# Patient Record
Sex: FEMALE | Race: WHITE | Employment: FULL TIME | URBAN - METROPOLITAN AREA
[De-identification: names, ages, dates, MRNs, and addresses within clinical notes are randomized per-mention and may not be internally consistent; named-entity substitution may affect disease eponyms.]

---

## 2017-04-19 PROBLEM — Z98.84 S/P BARIATRIC SURGERY: Status: ACTIVE | Noted: 2017-04-19

## 2021-10-20 PROBLEM — E66.01 OBESITY, MORBID (HCC): Status: ACTIVE | Noted: 2021-10-20

## 2021-10-20 PROBLEM — Z98.84 S/P GASTRIC BYPASS: Status: ACTIVE | Noted: 2017-04-19

## 2024-04-04 RX ORDER — GARLIC EXTRACT 500 MG
1 CAPSULE ORAL DAILY
COMMUNITY

## 2024-04-04 RX ORDER — ACETAMINOPHEN 500 MG
500 TABLET ORAL EVERY 6 HOURS PRN
COMMUNITY

## 2024-04-04 RX ORDER — CHOLECALCIFEROL (VITAMIN D3) 50 MCG
1 TABLET ORAL
COMMUNITY

## 2024-04-04 RX ORDER — DIETHYLPROPION HYDROCHLORIDE 25 MG/1
1 TABLET ORAL
COMMUNITY

## 2024-04-04 RX ORDER — DOCUSATE SODIUM 100 MG/1
100 CAPSULE, LIQUID FILLED ORAL 2 TIMES DAILY
COMMUNITY

## 2024-04-04 RX ORDER — LORAZEPAM 0.5 MG/1
0.5 TABLET ORAL DAILY
COMMUNITY

## 2024-04-04 RX ORDER — NALTREXONE HYDROCHLORIDE AND BUPROPION HYDROCHLORIDE 8; 90 MG/1; MG/1
1 TABLET, EXTENDED RELEASE ORAL 2 TIMES DAILY
COMMUNITY

## 2024-04-04 NOTE — PAT NURSING NOTE
LM with Ina,  from Dr. Lee's office about need to reschedule since pt took her Phentermine dose on 4/3 . Verified with Angelo and anesthesia that pt has to be off meds for 1 week . Awaiting for callback .

## 2024-04-04 NOTE — PAT NURSING NOTE
Spoke with  Ina; pt will be rescheduled and was already notified by her . Will fax a new ticket .

## 2024-04-10 ENCOUNTER — ANESTHESIA (OUTPATIENT)
Dept: SURGERY | Facility: HOSPITAL | Age: 39
End: 2024-04-10
Payer: COMMERCIAL

## 2024-04-10 ENCOUNTER — ANESTHESIA EVENT (OUTPATIENT)
Dept: SURGERY | Facility: HOSPITAL | Age: 39
End: 2024-04-10
Payer: COMMERCIAL

## 2024-04-10 ENCOUNTER — HOSPITAL ENCOUNTER (OUTPATIENT)
Facility: HOSPITAL | Age: 39
Setting detail: HOSPITAL OUTPATIENT SURGERY
Discharge: HOME | End: 2024-04-10
Attending: OBSTETRICS & GYNECOLOGY | Admitting: OBSTETRICS & GYNECOLOGY
Payer: COMMERCIAL

## 2024-04-10 VITALS
DIASTOLIC BLOOD PRESSURE: 60 MMHG | OXYGEN SATURATION: 99 % | BODY MASS INDEX: 53.92 KG/M2 | RESPIRATION RATE: 14 BRPM | TEMPERATURE: 99 F | WEIGHT: 293 LBS | HEART RATE: 88 BPM | HEIGHT: 62 IN | SYSTOLIC BLOOD PRESSURE: 134 MMHG

## 2024-04-10 LAB
B-HCG UR QL: NEGATIVE
HPV I/H RISK 1 DNA SPEC QL NAA+PROBE: NEGATIVE

## 2024-04-10 PROCEDURE — 0UPD7HZ REMOVAL OF CONTRACEPTIVE DEVICE FROM UTERUS AND CERVIX, VIA NATURAL OR ARTIFICIAL OPENING: ICD-10-PCS | Performed by: OBSTETRICS & GYNECOLOGY

## 2024-04-10 PROCEDURE — 87624 HPV HI-RISK TYP POOLED RSLT: CPT | Performed by: OBSTETRICS & GYNECOLOGY

## 2024-04-10 PROCEDURE — 88300 SURGICAL PATH GROSS: CPT | Performed by: OBSTETRICS & GYNECOLOGY

## 2024-04-10 PROCEDURE — 81025 URINE PREGNANCY TEST: CPT

## 2024-04-10 PROCEDURE — 0UH97HZ INSERTION OF CONTRACEPTIVE DEVICE INTO UTERUS, VIA NATURAL OR ARTIFICIAL OPENING: ICD-10-PCS | Performed by: OBSTETRICS & GYNECOLOGY

## 2024-04-10 DEVICE — MIRENA IUD: Type: IMPLANTABLE DEVICE | Status: FUNCTIONAL

## 2024-04-10 RX ORDER — IBUPROFEN 600 MG/1
600 TABLET ORAL EVERY 8 HOURS PRN
Qty: 20 TABLET | Refills: 0 | Status: SHIPPED | OUTPATIENT
Start: 2024-04-10

## 2024-04-10 RX ORDER — DEXAMETHASONE SODIUM PHOSPHATE 4 MG/ML
VIAL (ML) INJECTION AS NEEDED
Status: DISCONTINUED | OUTPATIENT
Start: 2024-04-10 | End: 2024-04-10 | Stop reason: SURG

## 2024-04-10 RX ORDER — FAMOTIDINE 20 MG/1
20 TABLET, FILM COATED ORAL ONCE
Status: COMPLETED | OUTPATIENT
Start: 2024-04-10 | End: 2024-04-10

## 2024-04-10 RX ORDER — NALOXONE HYDROCHLORIDE 0.4 MG/ML
0.08 INJECTION, SOLUTION INTRAMUSCULAR; INTRAVENOUS; SUBCUTANEOUS AS NEEDED
Status: DISCONTINUED | OUTPATIENT
Start: 2024-04-10 | End: 2024-04-10

## 2024-04-10 RX ORDER — SODIUM CHLORIDE, SODIUM LACTATE, POTASSIUM CHLORIDE, CALCIUM CHLORIDE 600; 310; 30; 20 MG/100ML; MG/100ML; MG/100ML; MG/100ML
INJECTION, SOLUTION INTRAVENOUS CONTINUOUS
Status: DISCONTINUED | OUTPATIENT
Start: 2024-04-10 | End: 2024-04-10

## 2024-04-10 RX ORDER — MORPHINE SULFATE 4 MG/ML
2 INJECTION, SOLUTION INTRAMUSCULAR; INTRAVENOUS EVERY 10 MIN PRN
Status: DISCONTINUED | OUTPATIENT
Start: 2024-04-10 | End: 2024-04-10

## 2024-04-10 RX ORDER — HYDROMORPHONE HYDROCHLORIDE 1 MG/ML
0.2 INJECTION, SOLUTION INTRAMUSCULAR; INTRAVENOUS; SUBCUTANEOUS EVERY 5 MIN PRN
Status: DISCONTINUED | OUTPATIENT
Start: 2024-04-10 | End: 2024-04-10

## 2024-04-10 RX ORDER — FAMOTIDINE 10 MG/ML
20 INJECTION, SOLUTION INTRAVENOUS ONCE
Status: COMPLETED | OUTPATIENT
Start: 2024-04-10 | End: 2024-04-10

## 2024-04-10 RX ORDER — HYDROMORPHONE HYDROCHLORIDE 1 MG/ML
0.6 INJECTION, SOLUTION INTRAMUSCULAR; INTRAVENOUS; SUBCUTANEOUS EVERY 5 MIN PRN
Status: DISCONTINUED | OUTPATIENT
Start: 2024-04-10 | End: 2024-04-10

## 2024-04-10 RX ORDER — LIDOCAINE HYDROCHLORIDE 10 MG/ML
INJECTION, SOLUTION EPIDURAL; INFILTRATION; INTRACAUDAL; PERINEURAL AS NEEDED
Status: DISCONTINUED | OUTPATIENT
Start: 2024-04-10 | End: 2024-04-10 | Stop reason: SURG

## 2024-04-10 RX ORDER — LIDOCAINE HYDROCHLORIDE 40 MG/ML
SOLUTION TOPICAL AS NEEDED
Status: DISCONTINUED | OUTPATIENT
Start: 2024-04-10 | End: 2024-04-10 | Stop reason: SURG

## 2024-04-10 RX ORDER — ACETAMINOPHEN 500 MG
1000 TABLET ORAL ONCE
Status: DISCONTINUED | OUTPATIENT
Start: 2024-04-10 | End: 2024-04-10 | Stop reason: HOSPADM

## 2024-04-10 RX ORDER — MORPHINE SULFATE 4 MG/ML
4 INJECTION, SOLUTION INTRAMUSCULAR; INTRAVENOUS EVERY 10 MIN PRN
Status: DISCONTINUED | OUTPATIENT
Start: 2024-04-10 | End: 2024-04-10

## 2024-04-10 RX ORDER — HYDROMORPHONE HYDROCHLORIDE 1 MG/ML
0.4 INJECTION, SOLUTION INTRAMUSCULAR; INTRAVENOUS; SUBCUTANEOUS EVERY 5 MIN PRN
Status: DISCONTINUED | OUTPATIENT
Start: 2024-04-10 | End: 2024-04-10

## 2024-04-10 RX ORDER — MORPHINE SULFATE 10 MG/ML
6 INJECTION, SOLUTION INTRAMUSCULAR; INTRAVENOUS EVERY 10 MIN PRN
Status: DISCONTINUED | OUTPATIENT
Start: 2024-04-10 | End: 2024-04-10

## 2024-04-10 RX ORDER — ONDANSETRON 2 MG/ML
INJECTION INTRAMUSCULAR; INTRAVENOUS AS NEEDED
Status: DISCONTINUED | OUTPATIENT
Start: 2024-04-10 | End: 2024-04-10 | Stop reason: SURG

## 2024-04-10 RX ADMIN — DEXAMETHASONE SODIUM PHOSPHATE 4 MG: 4 MG/ML VIAL (ML) INJECTION at 12:29:00

## 2024-04-10 RX ADMIN — LIDOCAINE HYDROCHLORIDE 50 MG: 10 INJECTION, SOLUTION EPIDURAL; INFILTRATION; INTRACAUDAL; PERINEURAL at 12:15:00

## 2024-04-10 RX ADMIN — LIDOCAINE HYDROCHLORIDE 4 ML: 40 SOLUTION TOPICAL at 12:22:00

## 2024-04-10 RX ADMIN — ONDANSETRON 4 MG: 2 INJECTION INTRAMUSCULAR; INTRAVENOUS at 12:29:00

## 2024-04-10 RX ADMIN — SODIUM CHLORIDE, SODIUM LACTATE, POTASSIUM CHLORIDE, CALCIUM CHLORIDE: 600; 310; 30; 20 INJECTION, SOLUTION INTRAVENOUS at 12:15:00

## 2024-04-10 NOTE — ANESTHESIA POSTPROCEDURE EVALUATION
Patient: Ila Jose    Procedure Summary       Date: 04/10/24 Room / Location: [facility] MAIN OR  / [facility] MAIN OR    Anesthesia Start: 1215 Anesthesia Stop: 1251    Procedure: Mirena intrauterine device removal and replacement, under anesthesia, pap smear (Vagina ) Diagnosis: (Endometrial hyperplasia, morbid obesity)    Surgeons: Magali Lee MD Anesthesiologist: Pranay Linn MD    Anesthesia Type: general ASA Status: 4            Anesthesia Type: general    Vitals Value Taken Time   /70 04/10/24 1251   Temp 97.8 °F (36.6 °C) 04/10/24 1250   Pulse 88 04/10/24 1251   Resp 14 04/10/24 1251   SpO2 100 % 04/10/24 1251   Vitals shown include unfiled device data.    [facility] AN Post Evaluation:   Patient Evaluated in PACU  Patient Participation: complete - patient participated  Level of Consciousness: awake and alert  Pain Management: adequate  Airway Patency:patent  Yes    Nausea/Vomiting: none  Cardiovascular Status: acceptable  Respiratory Status: acceptable and nasal cannula  Postoperative Hydration acceptable      Pranay Linn MD  4/10/2024 12:51 PM

## 2024-04-10 NOTE — ANESTHESIA PREPROCEDURE EVALUATION
Anesthesia PreOp Note    HPI:     Ila Jose is a 38 year old female who presents for preoperative consultation requested by: Magali Lee MD    Date of Surgery: 4/10/2024    Procedure(s):  Mirena intrauterine device removal and replacement, under anesthesia  Indication: Endometrial hyperplasia, morbid obesity    Relevant Problems   No relevant active problems       NPO:  Last Liquid Consumption Date: 04/09/24  Last Liquid Consumption Time: 2315  Last Solid Consumption Date: 04/09/24  Last Solid Consumption Time: 2315  Last Liquid Consumption Date: 04/09/24          History Review:  Patient Active Problem List    Diagnosis Date Noted    Obesity, morbid (HCC) 10/20/2021    S/P gastric bypass 04/19/2017    History of pulmonary embolism     LATRELL (obstructive sleep apnea) 03/04/2015    Complex endometrial hyperplasia without atypia 01/13/2011    Allergic rhinitis 04/28/2010    Anxiety 04/28/2010    Rosacea 04/28/2010    PCOS (polycystic ovarian syndrome) 03/16/2010    Personal history of pulmonary embolism 03/16/2010    Obesity, unspecified 04/14/2008       Past Medical History:    Allergic rhinitis, cause unspecified    Anxiety state    Depression    Depressive disorder, not elsewhere classified    History of pulmonary embolism    Poss asso with BCP    HOSPITALIZATIONS    pulmonary embolus    Obesity    LATRELL (obstructive sleep apnea)    severe, AHI 36, REM AHI 77, O2 wally 63%.  AutoPAP    Panic disorder    PCOS (polycystic ovarian syndrome)    Pulmonary embolism ([facility])    2011 or 2013    Rosacea    Shortness of breath    as child    Sleep apnea    cpap    Visual impairment    glasses       Past Surgical History:   Procedure Laterality Date    D & c  05/2010    hysteroscopy D&C complex hyperplasia without atypia    Gastric bypass,obese<100cm maurilio-en-y  2016    Gastric bypass,obesity,sb reconstruc  2016    Other surgical history      wisdom teeth extraction    Tonsillectomy         Medications Prior to Admission    Medication Sig Dispense Refill Last Dose    acidophilus-pectin Oral Cap Take 1 capsule by mouth daily.       LORazepam 0.5 MG Oral Tab Take 1 tablet (0.5 mg total) by mouth daily.   4/10/2024 at 0930    docusate sodium 100 MG Oral Cap Take 1 capsule (100 mg total) by mouth 2 (two) times daily.   4/9/2024 at 1200    Cholecalciferol (VITAMIN D) 50 MCG (2000 UT) Oral Tab Take 1 tablet by mouth daily with breakfast.   4/9/2024 at 1200    Diethylpropion HCl 25 MG Oral Tab Take 1 tablet by mouth daily with breakfast.   4/3/2024    Naltrexone-buPROPion HCl ER (CONTRAVE) 8-90 MG Oral Tablet 12 Hr Take 1 tablet by mouth in the morning and 1 tablet before bedtime.   4/3/2024    acetaminophen 500 MG Oral Tab Take 1 tablet (500 mg total) by mouth every 6 (six) hours as needed for Pain.   4/10/2024 at 0930    metFORMIN HCl  MG Oral Tablet 24 Hr Take 1 tablet (750 mg total) by mouth daily with breakfast. (Patient taking differently: Take 1 tablet (750 mg total) by mouth daily with breakfast. Takes for PCOS) 90 tablet 2 4/9/2024 at 1200    Phentermine HCl 15 MG Oral Cap Take 1 capsule (15 mg total) by mouth every morning. 30 capsule 0 4/3/2024    topiramate 25 MG Oral Tab Take 1 tablet (25 mg total) by mouth daily with lunch. (Patient taking differently: Take 1 tablet (25 mg total) by mouth daily.) 30 tablet 0 4/9/2024 at 1200    Nystatin Does not apply Powder Apply to skin folds BID prn 3 each 5     Multiple Vitamin (MULTI-DAY) Oral Tab Take 2 tablets by mouth daily with breakfast.   4/3/2024    valACYclovir HCl 1 G Oral Tab TAKE ONE TABLET BY MOUTH ONE TIME DAILY 30 tablet 11 4/9/2024 at 1200    CLARITIN 10 MG OR TABS 1 TABLET DAILY   4/9/2024 at 1200     Current Facility-Administered Medications Ordered in Epic   Medication Dose Route Frequency Provider Last Rate Last Admin    lactated ringers infusion   Intravenous Continuous Magali Lee MD 20 mL/hr at 04/10/24 1030 New Bag at 04/10/24 1030    acetaminophen  (Tylenol Extra Strength) tab 1,000 mg  1,000 mg Oral Once Magali Lee MD         No current Epic-ordered outpatient medications on file.       Allergies   Allergen Reactions    Sulfa Antibiotics RASH       Family History   Problem Relation Age of Onset    Other (COPD) Father         smoker    Other (LATRELL) Father         on CPAP    Other (macular degeneration) Father     Other (thyroid disorder) Mother     Other (Other) Maternal Grandmother         blood disorder    Diabetes Maternal Grandfather     Other (Parkinons) Maternal Grandfather     Other (alzheimer's) Maternal Grandfather     Other (COPD) Paternal Grandmother         smoker    Other (COPD) Paternal Grandfather         smoker     Social History     Socioeconomic History    Marital status: Single   Tobacco Use    Smoking status: Never     Passive exposure: Yes    Smokeless tobacco: Never   Vaping Use    Vaping status: Never Used   Substance and Sexual Activity    Alcohol use: Yes     Alcohol/week: 1.0 standard drink of alcohol     Types: 1 Standard drinks or equivalent per week     Comment: 1-2 times a month    Drug use: No       Available pre-op labs reviewed.  Lab Results   Component Value Date    URINEPREG Negative 04/10/2024             Vital Signs:  Body mass index is 62.19 kg/m².   height is 1.575 m (5' 2\") and weight is 154.2 kg (340 lb) (abnormal). Her oral temperature is 98.4 °F (36.9 °C). Her blood pressure is 150/74 and her pulse is 93. Her respiration is 18 and oxygen saturation is 95%.   Vitals:    04/04/24 1432 04/10/24 1026   BP:  150/74   Pulse:  93   Resp:  18   Temp:  98.4 °F (36.9 °C)   TempSrc:  Oral   SpO2:  95%   Weight: (!) 154.2 kg (340 lb) (!) 154.2 kg (340 lb)   Height: 1.575 m (5' 2\") 1.575 m (5' 2\")        Anesthesia Evaluation     Patient summary reviewed and Nursing notes reviewed    No history of anesthetic complications   Airway   Mallampati: II  TM distance: >3 FB  Neck ROM: full  Dental - Dentition appears grossly intact      Pulmonary - normal exam   (+) sleep apnea  Cardiovascular - normal exam    ROS comment: TTE 2024 EF 55-60%    Neuro/Psych    (+)  anxiety/panic attacks,  depression      GI/Hepatic/Renal - negative ROS     Endo/Other - negative ROS   Abdominal                  Anesthesia Plan:   ASA:  4  Plan:   General  Airway:  ETT and Video laryngoscope  Post-op Pain Management: IV analgesics and Local  Plan Comments: I have discussed the anesthetic plan, major risks and alternatives with the patient and answered all questions. The patient desires to proceed with surgery and anesthesia as planned.     Informed Consent Plan and Risks Discussed With:  Patient and mother      I have informed Ila Jose and/or legal guardian or family member of the nature of the anesthetic plan, benefits, risks including possible dental damage if relevant, major complications, and any alternative forms of anesthetic management.   All of the patient's questions were answered to the best of my ability. The patient desires the anesthetic management as planned.  Pranay Linn MD  4/10/2024 11:25 AM  Present on Admission:  **None**

## 2024-04-10 NOTE — DISCHARGE INSTRUCTIONS
You may have cramps and bleeding for 24 hours after the procedure. This is normal. Use pads instead of tampons.  Do not douche or use tampons until your health care provider says it’s OK.  Do not use any vaginal medicines until you are told it’s OK.  Ask your health care provider when it’s OK to have sex again.  When should I call my health care provider?  Call your health care provider if you have:  Heavy bleeding (more than 1 pad an hour for 2 or more hours)  A fever over 100.4°F (38.0°C)  Increasing abdominal pain or tenderness  Foul-smelling discharge          HOME INSTRUCTIONS  [facility]G HOME CARE INSTRUCTIONS: POST-OP ANESTHESIA  The medication that you received for sedation or general anesthesia can last up to 24 hours. Your judgment and reflexes may be altered, even if you feel like your normal self.      We Recommend:   Do not drive any motor vehicle or bicycle   Avoid mowing the lawn, playing sports, or working with power tools/applicances (power saws, electric knives or mixers)   That you have someone stay with you on your first night home   Do not drink alcohol or take sleeping pills or tranquilizers   Do not sign legal documents within 24 hours of your procedure   If you had a nerve block for your surgery, take extra care not to put any pressure on your arm or hand for 24 hours    It is normal:  For you to have a sore throat if you had a breathing tube during surgery (while you were asleep!). The sore throat should get better within 48 hours. You can gargle with warm salt water (1/2 tsp in 4 oz warm water) or use a throat lozenge for comfort  To feel muscle aches or soreness especially in the abdomen, chest or neck. The achy feeling should go away in the next 24 hours  To feel weak, sleepy or \"wiped out\". Your should start feeling better in the next 24 hours.   To experience mild discomforts such as sore lip or tongue, headache, cramps, gas pains or a bloated feeling in your abdomen.   To experience  mild back pain or soreness for a day or two if you had spinal or epidural anesthesia.   If you had laparoscopic surgery, to feel shoulder pain or discomfort on the day of surgery.   For some patients to have nausea after surgery/anesthesia    If you feel nausea or experience vomiting:   Try to move around less.   Eat less than usual or drink only liquids until the next morning   Nausea should resolve in about 24 hours    If you have a problem when you are at home:    Call your surgeons office

## 2024-04-10 NOTE — H&P
[facility]  part of [facility]    History & Physical    Ila Jose Patient Status:  Hospital Outpatient Surgery    1985 MRN [id number]   Location [facility] PRE OP RECOVERY Attending Magali Lee MD   Hosp Day # 0 PCP Viridiana Griffith MD     Date:  4/10/2024  Date of Admission:  4/10/2024    History provided by:patient    Chief Complaint:   Retained IUD      HPI:   Ila Jose is a(n) 38 year old  female No LMP recorded. (Menstrual status: IUD - Intrauterine Device). who presents with a retained mirena IUD for IUD removal and replacement.  Pt unable to tolerate a pelvic exam in the office. Pt would also like a pap done while under anesthesia.    History     Past Medical History:    Allergic rhinitis, cause unspecified    Anxiety state    Depression    Depressive disorder, not elsewhere classified    History of pulmonary embolism    Poss asso with BCP    HOSPITALIZATIONS    pulmonary embolus    Obesity    LATRELL (obstructive sleep apnea)    severe, AHI 36, REM AHI 77, O2 wally 63%.  AutoPAP    Panic disorder    PCOS (polycystic ovarian syndrome)    Pulmonary embolism (HCC)     or     Rosacea    Shortness of breath    as child    Sleep apnea    cpap    Visual impairment    glasses     Past Surgical History:   Procedure Laterality Date    D & c  2010    hysteroscopy D&C complex hyperplasia without atypia    Gastric bypass,obese<100cm maurilio-en-y  2016    Gastric bypass,obesity,sb reconstruc  2016    Other surgical history      wisdom teeth extraction    Tonsillectomy       Family History   Problem Relation Age of Onset    Other (COPD) Father         smoker    Other (LATRELL) Father         on CPAP    Other (macular degeneration) Father     Other (thyroid disorder) Mother     Other (Other) Maternal Grandmother         blood disorder    Diabetes Maternal Grandfather     Other (Parkinons) Maternal Grandfather     Other (alzheimer's) Maternal Grandfather     Other  (COPD) Paternal Grandmother         smoker    Other (COPD) Paternal Grandfather         smoker     Social History:  Social History     Socioeconomic History    Marital status: Single   Tobacco Use    Smoking status: Never     Passive exposure: Yes    Smokeless tobacco: Never   Vaping Use    Vaping status: Never Used   Substance and Sexual Activity    Alcohol use: Yes     Alcohol/week: 1.0 standard drink of alcohol     Types: 1 Standard drinks or equivalent per week     Comment: 1-2 times a month    Drug use: No       Allergies/Medications:   Allergies:   Allergies   Allergen Reactions    Sulfa Antibiotics RASH     Medications Prior to Admission   Medication Sig    acidophilus-pectin Oral Cap Take 1 capsule by mouth daily.    LORazepam 0.5 MG Oral Tab Take 1 tablet (0.5 mg total) by mouth daily.    docusate sodium 100 MG Oral Cap Take 1 capsule (100 mg total) by mouth 2 (two) times daily.    Cholecalciferol (VITAMIN D) 50 MCG (2000 UT) Oral Tab Take 1 tablet by mouth daily with breakfast.    Diethylpropion HCl 25 MG Oral Tab Take 1 tablet by mouth daily with breakfast.    Naltrexone-buPROPion HCl ER (CONTRAVE) 8-90 MG Oral Tablet 12 Hr Take 1 tablet by mouth in the morning and 1 tablet before bedtime.    acetaminophen 500 MG Oral Tab Take 1 tablet (500 mg total) by mouth every 6 (six) hours as needed for Pain.    metFORMIN HCl  MG Oral Tablet 24 Hr Take 1 tablet (750 mg total) by mouth daily with breakfast. (Patient taking differently: Take 1 tablet (750 mg total) by mouth daily with breakfast. Takes for PCOS)    Phentermine HCl 15 MG Oral Cap Take 1 capsule (15 mg total) by mouth every morning.    topiramate 25 MG Oral Tab Take 1 tablet (25 mg total) by mouth daily with lunch. (Patient taking differently: Take 1 tablet (25 mg total) by mouth daily.)    Nystatin Does not apply Powder Apply to skin folds BID prn    Multiple Vitamin (MULTI-DAY) Oral Tab Take 2 tablets by mouth daily with breakfast.     valACYclovir HCl 1 G Oral Tab TAKE ONE TABLET BY MOUTH ONE TIME DAILY    CLARITIN 10 MG OR TABS 1 TABLET DAILY       Review of Systems:   Pertinent items are noted in HPI.    Physical Exam:   Vital Signs:  Blood pressure 150/74, pulse 93, temperature 98.4 °F (36.9 °C), temperature source Oral, resp. rate 18, height 5' 2\" (1.575 m), weight (!) 340 lb (154.2 kg), SpO2 95%.     GENERAL: well developed, well nourished, in no apparent distress  RESPIRATORY: clear to auscultation  CARDIOVASCULAR: S1, S2 normal, RRR  ABDOMEN: Soft, nondistended; nontender  GYNE/: deferred to OR    LYMPHATIC: no lymphadenopathy  EXTREMITIES:  non tender without edema  NEUROLOGIC: intact  PSYCHIATRIC: alert and oriented x 3; affect appropriate      Results:     Lab Results   Component Value Date    WBC 9.82 06/04/2016    HGB 13.8 04/10/2021    HCT 42.2 06/04/2016     06/04/2016    CREATSERUM 0.74 01/27/2022    BUN 12.0 01/27/2022     01/27/2022    K 4.08 01/27/2022     01/27/2022    CO2 21.7 (L) 01/27/2022    GLU 94 01/27/2022    CA 9.5 01/27/2022    ALB 3.7 04/02/2016    ALKPHO 64 04/02/2016    BILT 0.37 04/02/2016    TP 7.7 04/02/2016    AST 23 04/02/2016    ALT 10 04/10/2021    TSH 2.019 06/04/2016    BNP 10.4 06/21/2014    B12 783 10/20/2021       No results found.        Assessment/Plan:     Retained IUD, inability to tolerate office removal/replacement. Plan for mirena IUD removal and replacement under anesthesia. Risks/benefits discussed. Questions answered. Will also do a pap.      Magali Lee MD  4/10/2024

## 2024-04-10 NOTE — ANESTHESIA PROCEDURE NOTES
Airway  Date/Time: 4/10/2024 12:22 PM  Urgency: elective      General Information and Staff    Patient location during procedure: OR  Anesthesiologist: Pranay Linn MD  Performed: anesthesiologist   Performed by: Pranay Linn MD  Authorized by: Pranay Linn MD      Indications and Patient Condition  Indications for airway management: anesthesia  Sedation level: deep  Preoxygenated: yes  Patient position: sniffing  Mask difficulty assessment: 1 - vent by mask    Final Airway Details  Final airway type: endotracheal airway      Successful airway: ETT  Cuffed: yes   Successful intubation technique: Video laryngoscopy  Facilitating devices/methods: intubating stylet  Endotracheal tube insertion site: oral  Blade type: bernstein.  Blade size: #3  ETT size (mm): 7.0    Cormack-Lehane Classification: grade I - full view of glottis  Placement verified by: capnometry   Measured from: lips  ETT to lips (cm): 21  Number of attempts at approach: 1    Additional Comments  Very easy mask ventilation  Very easy video scope, grade 1 view, atraumatic x1, soft bite block placed

## 2024-04-10 NOTE — OPERATIVE REPORT
[facility]  part of [facility]    Gyne Operative Note    Ila Jose Patient Status:  Hospital Outpatient Surgery    1985 MRN [id number]   Location [facility] OPERATING ROOM Attending Magali Lee MD   Hosp Day # 0 PCP Viridiana Griffith MD     Preoperative Diagnosis: Endometrial hyperplasia, morbid obesity    Postoperative Diagnosis: Endometrial hyperplasia, morbid obesity    Procedures: Procedure(s):  Mirena intrauterine device removal and replacement, under anesthesia    Primary Surgeon: Magali Lee MD    Assistant: none    Anesthesia: General    Estimated Blood Loss: 0    Antibiotics:  none    Complications: none    Specimens :  IUD, gross only       Surgical Findings: normal mid position uterus, no adnexal masses palpated.  Normal appearing cervix     Condition: stable    Procedure Note:  Patient taken to the OR where she was placed under general anesthesia, prepped and draped in the normal usual sterile manner in dorsal lithotomy position.  Bladder was emptied via straight catheter.  Pelvic exam done which confirmed midposition uterus. Weighted speculum placed in the vagina. Cervix was visualized and a pap smear performed as pt was unable to tolerate one in the office. The cervix was then washed with betadyne and grasped with a tenaculum. Her  IUD was removed intact with polyp forceps . Uterus was sounded to 9cm. A new Mirena IUD was then placed without difficulty and strings cut to about 4cm outside the cervix. Single tooth tenaculum was removed.Patient was taken to the recovery room in good condition.  All sponge, needle count were correct x 2.      Magali Lee MD  4/10/2024

## (undated) DEVICE — CONTAINER,SPECIMEN,OR STERILE,4OZ: Brand: MEDLINE

## (undated) DEVICE — SYRINGE BULB 50/CS 48/PLT: Brand: MEDEGEN MEDICAL PRODUCTS, LLC

## (undated) DEVICE — SOLUTION IRRIG 1000ML ST H2O AQUALITE PLAS

## (undated) DEVICE — PACK CUSTOM D

## (undated) DEVICE — GLOVE SUR 6.5 SENSICARE PI PIP CRM PWD F